# Patient Record
Sex: MALE | Race: WHITE | NOT HISPANIC OR LATINO | Employment: OTHER | URBAN - METROPOLITAN AREA
[De-identification: names, ages, dates, MRNs, and addresses within clinical notes are randomized per-mention and may not be internally consistent; named-entity substitution may affect disease eponyms.]

---

## 2021-03-06 ENCOUNTER — IMMUNIZATION (OUTPATIENT)
Dept: PRIMARY CARE CLINIC | Facility: CLINIC | Age: 81
End: 2021-03-06
Payer: MEDICARE

## 2021-03-06 DIAGNOSIS — Z23 NEED FOR VACCINATION: Primary | ICD-10-CM

## 2021-03-06 PROCEDURE — 91300 PR SARS-COV- 2 COVID-19 VACCINE, NO PRSV, 30MCG/0.3ML, IM: CPT | Mod: S$GLB,,, | Performed by: INTERNAL MEDICINE

## 2021-03-06 PROCEDURE — 91300 PR SARS-COV- 2 COVID-19 VACCINE, NO PRSV, 30MCG/0.3ML, IM: ICD-10-PCS | Mod: S$GLB,,, | Performed by: INTERNAL MEDICINE

## 2021-03-06 PROCEDURE — 0001A PR IMMUNIZ ADMIN, SARS-COV-2 COVID-19 VACC, 30MCG/0.3ML, 1ST DOSE: ICD-10-PCS | Mod: CV19,S$GLB,, | Performed by: INTERNAL MEDICINE

## 2021-03-06 PROCEDURE — 0001A PR IMMUNIZ ADMIN, SARS-COV-2 COVID-19 VACC, 30MCG/0.3ML, 1ST DOSE: CPT | Mod: CV19,S$GLB,, | Performed by: INTERNAL MEDICINE

## 2021-03-06 RX ADMIN — Medication 0.3 ML: at 11:03

## 2023-01-04 ENCOUNTER — OFFICE VISIT (OUTPATIENT)
Dept: OPHTHALMOLOGY | Facility: CLINIC | Age: 83
End: 2023-01-04
Payer: MEDICARE

## 2023-01-04 DIAGNOSIS — H25.13 NUCLEAR SCLEROTIC CATARACT, BILATERAL: Primary | ICD-10-CM

## 2023-01-04 DIAGNOSIS — H35.373 EPIRETINAL MEMBRANE (ERM) OF BOTH EYES: ICD-10-CM

## 2023-01-04 DIAGNOSIS — H25.13 NUCLEAR SCLEROSIS, BILATERAL: ICD-10-CM

## 2023-01-04 PROCEDURE — 99204 OFFICE O/P NEW MOD 45 MIN: CPT | Mod: S$GLB,,, | Performed by: OPHTHALMOLOGY

## 2023-01-04 PROCEDURE — 92136 OPHTHALMIC BIOMETRY: CPT | Mod: RT,S$GLB,, | Performed by: OPHTHALMOLOGY

## 2023-01-04 PROCEDURE — 99999 PR PBB SHADOW E&M-EST. PATIENT-LVL II: ICD-10-PCS | Mod: PBBFAC,,, | Performed by: OPHTHALMOLOGY

## 2023-01-04 PROCEDURE — 99204 PR OFFICE/OUTPT VISIT, NEW, LEVL IV, 45-59 MIN: ICD-10-PCS | Mod: S$GLB,,, | Performed by: OPHTHALMOLOGY

## 2023-01-04 PROCEDURE — 1101F PT FALLS ASSESS-DOCD LE1/YR: CPT | Mod: CPTII,S$GLB,, | Performed by: OPHTHALMOLOGY

## 2023-01-04 PROCEDURE — 1126F AMNT PAIN NOTED NONE PRSNT: CPT | Mod: CPTII,S$GLB,, | Performed by: OPHTHALMOLOGY

## 2023-01-04 PROCEDURE — 1126F PR PAIN SEVERITY QUANTIFIED, NO PAIN PRESENT: ICD-10-PCS | Mod: CPTII,S$GLB,, | Performed by: OPHTHALMOLOGY

## 2023-01-04 PROCEDURE — 1101F PR PT FALLS ASSESS DOC 0-1 FALLS W/OUT INJ PAST YR: ICD-10-PCS | Mod: CPTII,S$GLB,, | Performed by: OPHTHALMOLOGY

## 2023-01-04 PROCEDURE — 1160F PR REVIEW ALL MEDS BY PRESCRIBER/CLIN PHARMACIST DOCUMENTED: ICD-10-PCS | Mod: CPTII,S$GLB,, | Performed by: OPHTHALMOLOGY

## 2023-01-04 PROCEDURE — 3288F PR FALLS RISK ASSESSMENT DOCUMENTED: ICD-10-PCS | Mod: CPTII,S$GLB,, | Performed by: OPHTHALMOLOGY

## 2023-01-04 PROCEDURE — 99999 PR PBB SHADOW E&M-EST. PATIENT-LVL II: CPT | Mod: PBBFAC,,, | Performed by: OPHTHALMOLOGY

## 2023-01-04 PROCEDURE — 92136 IOL MASTER - OU - BOTH EYES: ICD-10-PCS | Mod: RT,S$GLB,, | Performed by: OPHTHALMOLOGY

## 2023-01-04 PROCEDURE — 3288F FALL RISK ASSESSMENT DOCD: CPT | Mod: CPTII,S$GLB,, | Performed by: OPHTHALMOLOGY

## 2023-01-04 PROCEDURE — 1159F PR MEDICATION LIST DOCUMENTED IN MEDICAL RECORD: ICD-10-PCS | Mod: CPTII,S$GLB,, | Performed by: OPHTHALMOLOGY

## 2023-01-04 PROCEDURE — 1160F RVW MEDS BY RX/DR IN RCRD: CPT | Mod: CPTII,S$GLB,, | Performed by: OPHTHALMOLOGY

## 2023-01-04 PROCEDURE — 1159F MED LIST DOCD IN RCRD: CPT | Mod: CPTII,S$GLB,, | Performed by: OPHTHALMOLOGY

## 2023-01-04 RX ORDER — MOXIFLOXACIN 5 MG/ML
1 SOLUTION/ DROPS OPHTHALMIC
Status: CANCELLED | OUTPATIENT
Start: 2023-01-04

## 2023-01-04 RX ORDER — PHENYLEPHRINE HYDROCHLORIDE 100 MG/ML
1 SOLUTION/ DROPS OPHTHALMIC
Status: CANCELLED | OUTPATIENT
Start: 2023-01-04

## 2023-01-04 RX ORDER — TROPICAMIDE 10 MG/ML
1 SOLUTION/ DROPS OPHTHALMIC
Status: CANCELLED | OUTPATIENT
Start: 2023-01-04

## 2023-01-04 RX ORDER — TETRACAINE HYDROCHLORIDE 5 MG/ML
1 SOLUTION OPHTHALMIC
Status: CANCELLED | OUTPATIENT
Start: 2023-01-04

## 2023-01-04 RX ORDER — PHENYLEPHRINE HYDROCHLORIDE 25 MG/ML
1 SOLUTION/ DROPS OPHTHALMIC
Status: CANCELLED | OUTPATIENT
Start: 2023-01-04

## 2023-01-04 RX ORDER — PREDNISOLONE ACETATE-GATIFLOXACIN-BROMFENAC .75; 5; 1 MG/ML; MG/ML; MG/ML
1 SUSPENSION/ DROPS OPHTHALMIC 3 TIMES DAILY
Qty: 5 ML | Refills: 3 | Status: SHIPPED | OUTPATIENT
Start: 2023-01-04

## 2023-01-04 NOTE — PROGRESS NOTES
HPI    Patient presents today for a Cataract Evaluation. Patient notes vision as   blurry. 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 Patient notes no eye pain.     Last edited by Onur Hernandez on 1/4/2023  9:09 AM.            Assessment /Plan     For exam results, see Encounter Report.    Nuclear sclerotic cataract, bilateral    Nuclear sclerosis, bilateral    Epiretinal membrane (ERM) of both eyes      Visually Significant Cataract: Patient reports decreased vision consistent with the clinical amount of lenticular opacity, which reaches the level of visual significance and affects activities of daily living.     Specifically, this patient describes difficulty with:  - driving safely at night  - reading road signs  - reading small print  - deciphering medicine bottles  - reading the newspaper  - using the phone  - reading texts     Risks, benefits, and alternatives to cataract surgery were discussed and the consent reviewed. IOL options were discussed, including ATIOLs and the associated side effects and additional patient cost associated with them.   IOL Selections:   Right eye  IOL: CNA0T0 22.0     Left eye  IOL: CNA0T0 22.0    Pt wishes to have RIGHT eye done first.  Hx ERM with pucker OD>>OS followed by Dr Sarkar

## 2023-02-27 ENCOUNTER — TELEPHONE (OUTPATIENT)
Dept: OPHTHALMOLOGY | Facility: CLINIC | Age: 83
End: 2023-02-27
Payer: MEDICARE

## 2023-02-27 DIAGNOSIS — H25.11 NUCLEAR SCLEROTIC CATARACT OF RIGHT EYE: Primary | ICD-10-CM

## 2023-03-06 ENCOUNTER — TELEPHONE (OUTPATIENT)
Dept: OPHTHALMOLOGY | Facility: CLINIC | Age: 83
End: 2023-03-06
Payer: MEDICARE

## 2023-03-06 NOTE — TELEPHONE ENCOUNTER
----- Message from Love Russo sent at 3/3/2023  1:41 PM CST -----  Regarding: Pt call back  Pt returning missed call . Pts call back-[phone] (home)

## 2023-03-06 NOTE — TELEPHONE ENCOUNTER
Patient given arrival time of 10:30 am on Thursday March 9. Nothing to eat or drink after 9 pm.  Water, Gatorade after 9 pm until leaves home.  Start drops into the operative eye today. [address]

## 2023-03-27 ENCOUNTER — TELEPHONE (OUTPATIENT)
Dept: OPHTHALMOLOGY | Facility: CLINIC | Age: 83
End: 2023-03-27
Payer: MEDICARE

## 2023-06-30 ENCOUNTER — OFFICE VISIT (OUTPATIENT)
Dept: OPHTHALMOLOGY | Facility: CLINIC | Age: 83
End: 2023-06-30
Payer: MEDICARE

## 2023-06-30 DIAGNOSIS — H25.13 NUCLEAR SCLEROTIC CATARACT, BILATERAL: Primary | ICD-10-CM

## 2023-06-30 DIAGNOSIS — H35.373 EPIRETINAL MEMBRANE (ERM) OF BOTH EYES: ICD-10-CM

## 2023-06-30 PROCEDURE — 1101F PT FALLS ASSESS-DOCD LE1/YR: CPT | Mod: CPTII,S$GLB,, | Performed by: OPHTHALMOLOGY

## 2023-06-30 PROCEDURE — 3288F PR FALLS RISK ASSESSMENT DOCUMENTED: ICD-10-PCS | Mod: CPTII,S$GLB,, | Performed by: OPHTHALMOLOGY

## 2023-06-30 PROCEDURE — 99214 PR OFFICE/OUTPT VISIT, EST, LEVL IV, 30-39 MIN: ICD-10-PCS | Mod: S$GLB,,, | Performed by: OPHTHALMOLOGY

## 2023-06-30 PROCEDURE — 1101F PR PT FALLS ASSESS DOC 0-1 FALLS W/OUT INJ PAST YR: ICD-10-PCS | Mod: CPTII,S$GLB,, | Performed by: OPHTHALMOLOGY

## 2023-06-30 PROCEDURE — 3288F FALL RISK ASSESSMENT DOCD: CPT | Mod: CPTII,S$GLB,, | Performed by: OPHTHALMOLOGY

## 2023-06-30 PROCEDURE — 1160F RVW MEDS BY RX/DR IN RCRD: CPT | Mod: CPTII,S$GLB,, | Performed by: OPHTHALMOLOGY

## 2023-06-30 PROCEDURE — 1126F AMNT PAIN NOTED NONE PRSNT: CPT | Mod: CPTII,S$GLB,, | Performed by: OPHTHALMOLOGY

## 2023-06-30 PROCEDURE — 99999 PR PBB SHADOW E&M-EST. PATIENT-LVL III: ICD-10-PCS | Mod: PBBFAC,,, | Performed by: OPHTHALMOLOGY

## 2023-06-30 PROCEDURE — 99214 OFFICE O/P EST MOD 30 MIN: CPT | Mod: S$GLB,,, | Performed by: OPHTHALMOLOGY

## 2023-06-30 PROCEDURE — 1159F PR MEDICATION LIST DOCUMENTED IN MEDICAL RECORD: ICD-10-PCS | Mod: CPTII,S$GLB,, | Performed by: OPHTHALMOLOGY

## 2023-06-30 PROCEDURE — 1126F PR PAIN SEVERITY QUANTIFIED, NO PAIN PRESENT: ICD-10-PCS | Mod: CPTII,S$GLB,, | Performed by: OPHTHALMOLOGY

## 2023-06-30 PROCEDURE — 1160F PR REVIEW ALL MEDS BY PRESCRIBER/CLIN PHARMACIST DOCUMENTED: ICD-10-PCS | Mod: CPTII,S$GLB,, | Performed by: OPHTHALMOLOGY

## 2023-06-30 PROCEDURE — 99999 PR PBB SHADOW E&M-EST. PATIENT-LVL III: CPT | Mod: PBBFAC,,, | Performed by: OPHTHALMOLOGY

## 2023-06-30 PROCEDURE — 1159F MED LIST DOCD IN RCRD: CPT | Mod: CPTII,S$GLB,, | Performed by: OPHTHALMOLOGY

## 2023-06-30 RX ORDER — PREDNISOLONE ACETATE-GATIFLOXACIN-BROMFENAC .75; 5; 1 MG/ML; MG/ML; MG/ML
1 SUSPENSION/ DROPS OPHTHALMIC 3 TIMES DAILY
Qty: 5 ML | Refills: 3 | Status: SHIPPED | OUTPATIENT
Start: 2023-06-30

## 2023-06-30 NOTE — PROGRESS NOTES
HPI    Pt presents for follow up to cataract evaluation 1/4/23  Patient notes no eye pain  Patient notes vision as blurry OD >> OS  Last edited by Onur Hernandez on 6/30/2023  1:18 PM.            Assessment /Plan     For exam results, see Encounter Report.    Nuclear sclerotic cataract, bilateral    Epiretinal membrane (ERM) of both eyes        Visually Significant Cataract: Patient reports decreased vision consistent with the clinical amount of lenticular opacity, which reaches the level of visual significance and affects activities of daily living.     Specifically, this patient describes difficulty with:  - driving safely at night  - reading road signs  - reading small print  - deciphering medicine bottles  - reading the newspaper  - using the phone  - reading texts     Risks, benefits, and alternatives to cataract surgery were discussed and the consent reviewed. IOL options were discussed, including ATIOLs and the associated side effects and additional patient cost associated with them.   IOL Selections:   Right eye  IOL: CNA0T0 22.0     Left eye  IOL: CNA0T0 22.0    Pt wishes to have RIGHT eye done first.  Hx ERM with pucker OD>>OS followed by Dr Sarkar

## 2023-07-10 ENCOUNTER — TELEPHONE (OUTPATIENT)
Dept: OPHTHALMOLOGY | Facility: CLINIC | Age: 83
End: 2023-07-10
Payer: MEDICARE

## 2023-07-12 ENCOUNTER — TELEPHONE (OUTPATIENT)
Dept: OPHTHALMOLOGY | Facility: CLINIC | Age: 83
End: 2023-07-12
Payer: MEDICARE

## 2023-07-12 DIAGNOSIS — H25.11 NUCLEAR SCLEROTIC CATARACT OF RIGHT EYE: Primary | ICD-10-CM

## 2023-07-27 ENCOUNTER — TELEPHONE (OUTPATIENT)
Dept: OPHTHALMOLOGY | Facility: CLINIC | Age: 83
End: 2023-07-27
Payer: MEDICARE

## 2023-07-27 NOTE — TELEPHONE ENCOUNTER
----- Message from El Mcconnell sent at 7/27/2023 11:27 AM CDT -----  Contact: [phone]  Pt is calling to get a referral for Dr. Jones  for his macular pucker. Please call back to further assist once referral is put in.

## 2023-07-27 NOTE — TELEPHONE ENCOUNTER
----- Message from Jeannine Tellez sent at 7/27/2023  2:06 PM CDT -----  Contact: june @ [phone]  JENNIFER JENKINS calling regarding Appointment Access  (message) for #pt is calling about a package the lhe supposed to get for surgery. Asking for call back

## 2023-08-07 ENCOUNTER — TELEPHONE (OUTPATIENT)
Dept: OPHTHALMOLOGY | Facility: CLINIC | Age: 83
End: 2023-08-07
Payer: MEDICARE

## 2023-08-16 ENCOUNTER — TELEPHONE (OUTPATIENT)
Dept: OPHTHALMOLOGY | Facility: CLINIC | Age: 83
End: 2023-08-16
Payer: MEDICARE

## 2023-08-16 ENCOUNTER — ANESTHESIA EVENT (OUTPATIENT)
Dept: SURGERY | Facility: HOSPITAL | Age: 83
End: 2023-08-16
Payer: MEDICARE

## 2023-08-16 NOTE — ANESTHESIA PREPROCEDURE EVALUATION
08/16/2023  Joni Avelar is a 83 y.o., male.      Pre-op Assessment    I have reviewed the Patient Summary Reports.    I have reviewed the NPO Status.   I have reviewed the Medications.     Review of Systems  Anesthesia Hx:  Denies Family Hx of Anesthesia complications.   Denies Personal Hx of Anesthesia complications.   Hematology/Oncology:  Hematology Normal   Oncology Normal     EENT/Dental:  EENT/Dental Normal Eyes:    Cardiovascular:   Hypertension Past MI CAD  CABG/stent Dysrhythmias atrial fibrillation hyperlipidemia    Pulmonary:  Pulmonary Normal    Renal/:   Chronic Renal Disease renal calculi    Hepatic/GI:  Hepatic/GI Normal    81-year-old male with hypertension, abnormal lipids, non-ST segment elevation MI in August 2015 followed by coronary bypass surgery, history of paroxysmal atrial fibrillation, history of DVT   and nephrolithiasis   The patient was admitted with bradycardia which probably represented uncounted PACs and PVCs. His atenolol dose was decreased       Physical Exam  General: Well nourished, Cooperative, Alert and Oriented    Airway:  Mouth Opening: Normal  TM Distance: Normal  Tongue: Normal        Anesthesia Plan  Type of Anesthesia, risks & benefits discussed:    Anesthesia Type: MAC  Intra-op Monitoring Plan: Standard ASA Monitors  Post Op Pain Control Plan: multimodal analgesia and IV/PO Opioids PRN  Induction:  IV  Informed Consent: Informed consent signed with the Patient and all parties understand the risks and agree with anesthesia plan.  All questions answered.   ASA Score: 3  Day of Surgery Review of History & Physical: H&P Update referred to the surgeon/provider.I have interviewed and examined the patient. I have reviewed the patient's H&P dated: There are no significant changes. H&P completed by Anesthesiologist.    Ready For Surgery From Anesthesia Perspective.      .

## 2023-08-16 NOTE — TELEPHONE ENCOUNTER
----- Message from Delores Greco sent at 8/16/2023 11:42 AM CDT -----  Consult/Advisory    Name Of Caller:Joni       Contact Preference:[phone]    Nature of call: Ptn requesting a call to discuss his sx

## 2023-08-16 NOTE — TELEPHONE ENCOUNTER
----- Message from El Mcconnell sent at 8/16/2023 11:56 AM CDT -----  Contact: [phone]  Pt is returning your call about his SX. Please call back to further assist.

## 2023-08-17 ENCOUNTER — TELEPHONE (OUTPATIENT)
Dept: OPHTHALMOLOGY | Facility: CLINIC | Age: 83
End: 2023-08-17
Payer: MEDICARE

## 2023-08-17 RX ORDER — AMLODIPINE BESYLATE 5 MG/1
TABLET ORAL
COMMUNITY
Start: 2023-07-07

## 2023-08-17 RX ORDER — APIXABAN 5 MG/1
TABLET, FILM COATED ORAL
COMMUNITY
Start: 2023-07-19

## 2023-08-17 RX ORDER — IRBESARTAN 300 MG/1
150 TABLET ORAL 2 TIMES DAILY
COMMUNITY
Start: 2023-07-24

## 2023-08-17 RX ORDER — ASPIRIN 81 MG/1
81 TABLET ORAL
COMMUNITY

## 2023-08-17 NOTE — TELEPHONE ENCOUNTER
Patient given arrival time of 8:30 am on Monday August 21  . Nothing to eat or drink after 11:59 pm.  Water, Gatorade after 11:59 pm until leaves home.  Start drops into the operative eye today. [address] [facility]

## 2023-08-17 NOTE — PRE-PROCEDURE INSTRUCTIONS
- Nothing to eat or drink after midinight, except AM meds with small sips of water, Gatorade/Powerade  - Hold all Diabetic meds AM of surgery  - Hold all Insulin AM of surgery  - Hold all Fluid pills AM of surgery  - Hold all non-insulin shots until after surgery (Ozempic, Mounjaro, Trulicity, Victoza, Byetta, Wegovy and Adlyxin) (up to 7 days prior)  - Take all B/P meds, except those that contain a fluid pill  - Hold all vits and herbal meds AM of surgery  - Use inhalers as needed and bring AM of surgery  - Take blood thinner meds AM of surgery  - Use eye drops as directed  - Shower and wash face with dial soap for 3 mins PM prior and AM of surgery  - No powder, lotions, creams, (makeup),  or jewelry    - Wear comfortable clothing (button up shirt)    (Patients ride may not leave while patient is in surgery)    -- 2nd floor surgery ctr at [facility] @ [address] [facility] [place] [address]       Pt voiced understanding

## 2023-08-18 RX ORDER — IBUPROFEN 200 MG
200 TABLET ORAL EVERY 6 HOURS PRN
COMMUNITY

## 2023-08-18 RX ORDER — ACETAMINOPHEN 500 MG
500 TABLET ORAL EVERY 6 HOURS PRN
COMMUNITY

## 2023-08-21 ENCOUNTER — ANESTHESIA (OUTPATIENT)
Dept: SURGERY | Facility: HOSPITAL | Age: 83
End: 2023-08-21
Payer: MEDICARE

## 2023-08-21 ENCOUNTER — HOSPITAL ENCOUNTER (OUTPATIENT)
Facility: HOSPITAL | Age: 83
Discharge: HOME | End: 2023-08-21
Attending: OPHTHALMOLOGY | Admitting: OPHTHALMOLOGY
Payer: MEDICARE

## 2023-08-21 VITALS
HEIGHT: 73 IN | WEIGHT: 175 LBS | DIASTOLIC BLOOD PRESSURE: 67 MMHG | BODY MASS INDEX: 23.19 KG/M2 | TEMPERATURE: 98 F | SYSTOLIC BLOOD PRESSURE: 125 MMHG | HEART RATE: 68 BPM | RESPIRATION RATE: 16 BRPM | OXYGEN SATURATION: 96 %

## 2023-08-21 DIAGNOSIS — H25.11 NUCLEAR SCLEROTIC CATARACT OF RIGHT EYE: Primary | ICD-10-CM

## 2023-08-21 PROCEDURE — 25000003 PHARM REV CODE 250: Performed by: OPHTHALMOLOGY

## 2023-08-21 PROCEDURE — 36000706: Performed by: OPHTHALMOLOGY

## 2023-08-21 PROCEDURE — D9220A PRA ANESTHESIA: ICD-10-PCS | Mod: ,,, | Performed by: NURSE ANESTHETIST, CERTIFIED REGISTERED

## 2023-08-21 PROCEDURE — V2632 POST CHMBR INTRAOCULAR LENS: HCPCS | Performed by: OPHTHALMOLOGY

## 2023-08-21 PROCEDURE — 37000008 HC ANESTHESIA 1ST 15 MINUTES: Performed by: OPHTHALMOLOGY

## 2023-08-21 PROCEDURE — 66984 PR REMOVAL, CATARACT, W/INSRT INTRAOC LENS, W/O ENDO CYCLO: ICD-10-PCS | Mod: RT,,, | Performed by: OPHTHALMOLOGY

## 2023-08-21 PROCEDURE — 99900035 HC TECH TIME PER 15 MIN (STAT)

## 2023-08-21 PROCEDURE — D9220A PRA ANESTHESIA: Mod: ,,, | Performed by: NURSE ANESTHETIST, CERTIFIED REGISTERED

## 2023-08-21 PROCEDURE — 36000707: Performed by: OPHTHALMOLOGY

## 2023-08-21 PROCEDURE — 37000009 HC ANESTHESIA EA ADD 15 MINS: Performed by: OPHTHALMOLOGY

## 2023-08-21 PROCEDURE — 63600175 PHARM REV CODE 636 W HCPCS: Performed by: NURSE ANESTHETIST, CERTIFIED REGISTERED

## 2023-08-21 PROCEDURE — 71000015 HC POSTOP RECOV 1ST HR: Performed by: OPHTHALMOLOGY

## 2023-08-21 PROCEDURE — 94761 N-INVAS EAR/PLS OXIMETRY MLT: CPT

## 2023-08-21 PROCEDURE — 66984 XCAPSL CTRC RMVL W/O ECP: CPT | Mod: RT,,, | Performed by: OPHTHALMOLOGY

## 2023-08-21 DEVICE — LENS CLAREON AUTONOME 22.0D: Type: IMPLANTABLE DEVICE | Site: EYE | Status: FUNCTIONAL

## 2023-08-21 RX ORDER — PROPARACAINE HYDROCHLORIDE 5 MG/ML
1 SOLUTION/ DROPS OPHTHALMIC
Status: DISCONTINUED | OUTPATIENT
Start: 2023-08-21 | End: 2023-08-21 | Stop reason: HOSPADM

## 2023-08-21 RX ORDER — ACETAMINOPHEN 325 MG/1
650 TABLET ORAL EVERY 4 HOURS PRN
Status: DISCONTINUED | OUTPATIENT
Start: 2023-08-21 | End: 2023-08-21 | Stop reason: HOSPADM

## 2023-08-21 RX ORDER — MOXIFLOXACIN 5 MG/ML
1 SOLUTION/ DROPS OPHTHALMIC
Status: DISCONTINUED | OUTPATIENT
Start: 2023-08-21 | End: 2023-08-21 | Stop reason: HOSPADM

## 2023-08-21 RX ORDER — MIDAZOLAM HYDROCHLORIDE 1 MG/ML
INJECTION, SOLUTION INTRAMUSCULAR; INTRAVENOUS
Status: DISCONTINUED | OUTPATIENT
Start: 2023-08-21 | End: 2023-08-21

## 2023-08-21 RX ORDER — TETRACAINE HYDROCHLORIDE 5 MG/ML
SOLUTION OPHTHALMIC
Status: DISCONTINUED | OUTPATIENT
Start: 2023-08-21 | End: 2023-08-21 | Stop reason: HOSPADM

## 2023-08-21 RX ORDER — PHENYLEPHRINE HYDROCHLORIDE 100 MG/ML
1 SOLUTION/ DROPS OPHTHALMIC
Status: DISCONTINUED | OUTPATIENT
Start: 2023-08-21 | End: 2023-08-21 | Stop reason: HOSPADM

## 2023-08-21 RX ORDER — MOXIFLOXACIN 5 MG/ML
SOLUTION/ DROPS OPHTHALMIC
Status: DISCONTINUED | OUTPATIENT
Start: 2023-08-21 | End: 2023-08-21 | Stop reason: HOSPADM

## 2023-08-21 RX ORDER — LIDOCAINE HYDROCHLORIDE 40 MG/ML
INJECTION, SOLUTION RETROBULBAR
Status: DISCONTINUED | OUTPATIENT
Start: 2023-08-21 | End: 2023-08-21 | Stop reason: HOSPADM

## 2023-08-21 RX ORDER — MOXIFLOXACIN 5 MG/ML
1 SOLUTION/ DROPS OPHTHALMIC
Status: COMPLETED | OUTPATIENT
Start: 2023-08-21 | End: 2023-08-21

## 2023-08-21 RX ORDER — CYCLOP/TROP/PROPA/PHEN/KET/WAT 1-1-0.1%
1 DROPS (EA) OPHTHALMIC (EYE)
Status: COMPLETED | OUTPATIENT
Start: 2023-08-21 | End: 2023-08-21

## 2023-08-21 RX ADMIN — MIDAZOLAM HYDROCHLORIDE 1 MG: 1 INJECTION, SOLUTION INTRAMUSCULAR; INTRAVENOUS at 09:08

## 2023-08-21 RX ADMIN — Medication 1 DROP: at 09:08

## 2023-08-21 RX ADMIN — MOXIFLOXACIN OPHTHALMIC 1 DROP: 5 SOLUTION/ DROPS OPHTHALMIC at 09:08

## 2023-08-21 RX ADMIN — MOXIFLOXACIN 1 DROP: 5 SOLUTION/ DROPS OPHTHALMIC at 10:08

## 2023-08-21 NOTE — OP NOTE
SURGEON:  Jewel Ho M.D.    PREOPERATIVE DIAGNOSIS:    Nuclear Sclerotic Cataract Right Eye    POSTOPERATIVE DIAGNOSIS:    Nuclear Sclerotic Cataract Right Eye    PROCEDURES:    Phacoemulsification with  intraocular lens, Right eye (98389)    DATE OF SURGERY: 08/21/2023    IMPLANT: CNA0T0 22.0    ANESTHESIA:  MAC with topical Lidocaine    COMPLICATIONS:  None    ESTIMATED BLOOD LOSS: None    SPECIMENS: None    INDICATIONS:    The patient has a history of painless progressive visual loss and difficulty with activities of daily living, which specifically include difficult driving at night due to glare and difficulty reading small print, secondary to cataract formation.  After a thorough discussion of the risks, benefits, and alternatives to cataract surgery, including, but not limited to, the rare risks of infection, retinal detachment, hemorrhage, need for additional surgery, loss of vision, and even loss of the eye, the patient voices understanding and desires to proceed.    DESCRIPTION OF PROCEDURE:    The patients IOL calculations were reviewed, and the lens selection confirmed.   After verification and marking of the proper eye in the preop holding area, the patient was brought to the operating room in supine position where the eye was prepped and draped in standard sterile fashion with 5% Betadine and a lid speculum placed in the eye.   Topical 4% Lidocaine was used in addition to the preoperative anesthesia and the procedure was begun by the creation of a paracentesis incision through which viscoelastic was used to fill the anterior chamber.  Next, a keratome blade was used to create a triplanar temporal clear corneal incision and a cystotome and Utrata forceps used to fashion a continuous curvilinear capsulorrhexis.  Hydrodissection was carried out using the Call hydrodissection cannula and the nucleus was found to be mobile.  Phacoemulsification of the nucleus was carried out using a quick chop technique,  and all remaining epinuclear and cortical material was removed.  The eye was then reformed with Viscoelastic and the  intraocular lens was implanted into the capsular bag.  All remaining viscoelastics were removed from the eye and at the end of the case the pupil was round, the lens was well-centered within the capsular bag and all wounds were found to be water tight.  Drops of Vigamox and Pred Forte were instilled and a shield was placed over the eye. The patient will follow up with Dr. Ho in the morning.

## 2023-08-21 NOTE — DISCHARGE INSTRUCTIONS
CATARACT SURGERY    POST-OPERATIVE INSTRUCTIONS    · Apply drops THREE times a day into operative eye for 30 days.    · DO NOT rub your eye    · Wear protective sunglasses during the day    · Resume moderate activity    · Bathe/shower/wash face normally    · DO NOT apply makeup around the operative eye for 1 week.         You should expect    - Blurry vision and halos for 24-48 hours    - Dilated pupil for 24-48 hours    - Scratchy feeling in the eye for 1-2 days    - Curved shadow in your peripheral vision for 2-3 weeks    - Occasional flickering of lights for up to 1 week    -If you experience severe pain or nausea, please call Dr Ho or the on-call doctor at [phone]    - Plan to see Dr Ho tomorrow .      [facility]    [address] [facility] [address]    ** Most patients can drive the next day, but if you do not feel comfortable driving, please arrange for transportation.

## 2023-08-21 NOTE — TRANSFER OF CARE
"Anesthesia Transfer of Care Note    Patient: Joni Avelar    Procedure(s) Performed: Procedure(s) (LRB):  EXTRACTION, CATARACT, WITH IOL INSERTION (Right)    Patient location: PACU    Anesthesia Type: MAC    Transport from OR: Transported from OR on room air with adequate spontaneous ventilation    Post pain: adequate analgesia    Post assessment: no apparent anesthetic complications    Post vital signs: stable    Level of consciousness: awake    Nausea/Vomiting: no nausea/vomiting    Complications: none    Transfer of care protocol was followed      Last vitals:   Visit Vitals  /83 (BP Location: Left arm, Patient Position: Sitting)   Pulse 60   Temp 36.8 °C (98.2 °F) (Temporal)   Resp 16   Ht 6' 1" (1.854 m)   Wt 79.4 kg (175 lb)   SpO2 97%   BMI 23.09 kg/m²     "

## 2023-08-21 NOTE — DISCHARGE SUMMARY
Outcome: Successful outpatient ophthalmic surgical procedure  Preprinted Instructions given to patient.  Regular diet.  Activity: No restrictions  Meds: see Med Rec  Condition: stable  Follow up: 1 day with Dr Ho  Disposition: Home  Diagnosis: s/p eye surgery  Date of discharge: 08/21/2023

## 2023-08-21 NOTE — ANESTHESIA POSTPROCEDURE EVALUATION
Anesthesia Post Evaluation    Patient: Joni Avelar    Procedure(s) Performed: Procedure(s) (LRB):  EXTRACTION, CATARACT, WITH IOL INSERTION (Right)    Final Anesthesia Type: MAC      Patient location during evaluation: PACU  Patient participation: Yes- Able to Participate  Level of consciousness: awake and alert  Post-procedure vital signs: reviewed and stable  Pain management: adequate  Airway patency: patent    PONV status at discharge: No PONV  Anesthetic complications: no      Cardiovascular status: blood pressure returned to baseline  Respiratory status: unassisted, spontaneous ventilation and room air  Hydration status: euvolemic  Follow-up not needed.          Vitals Value Taken Time   /76 08/21/23 1016   Temp 36.8 °C (98.2 °F) 08/21/23 1010   Pulse 70 08/21/23 1016   Resp 16 08/21/23 1016   SpO2 96 % 08/21/23 1016         No case tracking events are documented in the log.      Pain/Conrado Score: Conrado Score: 10 (8/21/2023 10:10 AM)

## 2023-08-22 ENCOUNTER — OFFICE VISIT (OUTPATIENT)
Dept: OPHTHALMOLOGY | Facility: CLINIC | Age: 83
End: 2023-08-22
Payer: MEDICARE

## 2023-08-22 DIAGNOSIS — Z98.890 POST-OPERATIVE STATE: Primary | ICD-10-CM

## 2023-08-22 DIAGNOSIS — H25.11 NUCLEAR SCLEROSIS OF RIGHT EYE: ICD-10-CM

## 2023-08-22 PROCEDURE — 99999 PR PBB SHADOW E&M-EST. PATIENT-LVL III: CPT | Mod: PBBFAC,,, | Performed by: OPHTHALMOLOGY

## 2023-08-22 PROCEDURE — 1101F PT FALLS ASSESS-DOCD LE1/YR: CPT | Mod: CPTII,S$GLB,, | Performed by: OPHTHALMOLOGY

## 2023-08-22 PROCEDURE — 99024 PR POST-OP FOLLOW-UP VISIT: ICD-10-PCS | Mod: S$GLB,,, | Performed by: OPHTHALMOLOGY

## 2023-08-22 PROCEDURE — 1101F PR PT FALLS ASSESS DOC 0-1 FALLS W/OUT INJ PAST YR: ICD-10-PCS | Mod: CPTII,S$GLB,, | Performed by: OPHTHALMOLOGY

## 2023-08-22 PROCEDURE — 1160F PR REVIEW ALL MEDS BY PRESCRIBER/CLIN PHARMACIST DOCUMENTED: ICD-10-PCS | Mod: CPTII,S$GLB,, | Performed by: OPHTHALMOLOGY

## 2023-08-22 PROCEDURE — 99999 PR PBB SHADOW E&M-EST. PATIENT-LVL III: ICD-10-PCS | Mod: PBBFAC,,, | Performed by: OPHTHALMOLOGY

## 2023-08-22 PROCEDURE — 3288F FALL RISK ASSESSMENT DOCD: CPT | Mod: CPTII,S$GLB,, | Performed by: OPHTHALMOLOGY

## 2023-08-22 PROCEDURE — 1159F MED LIST DOCD IN RCRD: CPT | Mod: CPTII,S$GLB,, | Performed by: OPHTHALMOLOGY

## 2023-08-22 PROCEDURE — 1160F RVW MEDS BY RX/DR IN RCRD: CPT | Mod: CPTII,S$GLB,, | Performed by: OPHTHALMOLOGY

## 2023-08-22 PROCEDURE — 3288F PR FALLS RISK ASSESSMENT DOCUMENTED: ICD-10-PCS | Mod: CPTII,S$GLB,, | Performed by: OPHTHALMOLOGY

## 2023-08-22 PROCEDURE — 1126F PR PAIN SEVERITY QUANTIFIED, NO PAIN PRESENT: ICD-10-PCS | Mod: CPTII,S$GLB,, | Performed by: OPHTHALMOLOGY

## 2023-08-22 PROCEDURE — 1159F PR MEDICATION LIST DOCUMENTED IN MEDICAL RECORD: ICD-10-PCS | Mod: CPTII,S$GLB,, | Performed by: OPHTHALMOLOGY

## 2023-08-22 PROCEDURE — 1126F AMNT PAIN NOTED NONE PRSNT: CPT | Mod: CPTII,S$GLB,, | Performed by: OPHTHALMOLOGY

## 2023-08-22 PROCEDURE — 99024 POSTOP FOLLOW-UP VISIT: CPT | Mod: S$GLB,,, | Performed by: OPHTHALMOLOGY

## 2023-08-22 NOTE — PROGRESS NOTES
HPI    Patient presents for a one day P/O OD. Patient notes vision as stable.   Patient denies eye pain.     PGB TID OD    DATE OF SURGERY: 08/21/2023  IMPLANT: CNA0T0 22.0  Last edited by Onur Hernandez on 8/22/2023  8:25 AM.            Assessment /Plan     For exam results, see Encounter Report.    Post-operative state    Nuclear sclerosis of right eye      Slit lamp exam:  L/L: nl  K: clear, wound sealed  AC: 1+ cell  Lens: IOL centered and stable    POD1 s/p Phaco/IOL  Appropriate precautions and post op medications reviewed.  Patient instructed to call or come in if symptoms of redness, decreased vision, or pain are experienced.

## 2023-08-31 ENCOUNTER — OFFICE VISIT (OUTPATIENT)
Dept: OPHTHALMOLOGY | Facility: CLINIC | Age: 83
End: 2023-08-31
Payer: MEDICARE

## 2023-08-31 DIAGNOSIS — H25.13 NUCLEAR SCLEROSIS, BILATERAL: Primary | ICD-10-CM

## 2023-08-31 DIAGNOSIS — Z98.890 POST-OPERATIVE STATE: ICD-10-CM

## 2023-08-31 PROCEDURE — 1159F MED LIST DOCD IN RCRD: CPT | Mod: CPTII,S$GLB,, | Performed by: OPHTHALMOLOGY

## 2023-08-31 PROCEDURE — 1159F PR MEDICATION LIST DOCUMENTED IN MEDICAL RECORD: ICD-10-PCS | Mod: CPTII,S$GLB,, | Performed by: OPHTHALMOLOGY

## 2023-08-31 PROCEDURE — 99024 POSTOP FOLLOW-UP VISIT: CPT | Mod: S$GLB,,, | Performed by: OPHTHALMOLOGY

## 2023-08-31 PROCEDURE — 1160F RVW MEDS BY RX/DR IN RCRD: CPT | Mod: CPTII,S$GLB,, | Performed by: OPHTHALMOLOGY

## 2023-08-31 PROCEDURE — 1126F PR PAIN SEVERITY QUANTIFIED, NO PAIN PRESENT: ICD-10-PCS | Mod: CPTII,S$GLB,, | Performed by: OPHTHALMOLOGY

## 2023-08-31 PROCEDURE — 3288F PR FALLS RISK ASSESSMENT DOCUMENTED: ICD-10-PCS | Mod: CPTII,S$GLB,, | Performed by: OPHTHALMOLOGY

## 2023-08-31 PROCEDURE — 3288F FALL RISK ASSESSMENT DOCD: CPT | Mod: CPTII,S$GLB,, | Performed by: OPHTHALMOLOGY

## 2023-08-31 PROCEDURE — 1101F PR PT FALLS ASSESS DOC 0-1 FALLS W/OUT INJ PAST YR: ICD-10-PCS | Mod: CPTII,S$GLB,, | Performed by: OPHTHALMOLOGY

## 2023-08-31 PROCEDURE — 99999 PR PBB SHADOW E&M-EST. PATIENT-LVL III: ICD-10-PCS | Mod: PBBFAC,,, | Performed by: OPHTHALMOLOGY

## 2023-08-31 PROCEDURE — 1160F PR REVIEW ALL MEDS BY PRESCRIBER/CLIN PHARMACIST DOCUMENTED: ICD-10-PCS | Mod: CPTII,S$GLB,, | Performed by: OPHTHALMOLOGY

## 2023-08-31 PROCEDURE — 1101F PT FALLS ASSESS-DOCD LE1/YR: CPT | Mod: CPTII,S$GLB,, | Performed by: OPHTHALMOLOGY

## 2023-08-31 PROCEDURE — 99024 PR POST-OP FOLLOW-UP VISIT: ICD-10-PCS | Mod: S$GLB,,, | Performed by: OPHTHALMOLOGY

## 2023-08-31 PROCEDURE — 99999 PR PBB SHADOW E&M-EST. PATIENT-LVL III: CPT | Mod: PBBFAC,,, | Performed by: OPHTHALMOLOGY

## 2023-08-31 PROCEDURE — 1126F AMNT PAIN NOTED NONE PRSNT: CPT | Mod: CPTII,S$GLB,, | Performed by: OPHTHALMOLOGY

## 2023-09-01 ENCOUNTER — TELEPHONE (OUTPATIENT)
Dept: OPHTHALMOLOGY | Facility: CLINIC | Age: 83
End: 2023-09-01
Payer: MEDICARE

## 2023-09-01 RX ORDER — PHENYLEPHRINE HYDROCHLORIDE 100 MG/ML
1 SOLUTION/ DROPS OPHTHALMIC
Status: CANCELLED | OUTPATIENT
Start: 2023-09-01

## 2023-09-01 RX ORDER — MOXIFLOXACIN 5 MG/ML
1 SOLUTION/ DROPS OPHTHALMIC
Status: CANCELLED | OUTPATIENT
Start: 2023-09-01

## 2023-09-01 RX ORDER — CYCLOP/TROP/PROPA/PHEN/KET/WAT 1-1-0.1%
1 DROPS (EA) OPHTHALMIC (EYE)
Status: CANCELLED | OUTPATIENT
Start: 2023-09-01

## 2023-09-01 NOTE — TELEPHONE ENCOUNTER
Left message for patient to call back    Start Senna-Venkat 1 tablets at night for a few nights, then can increase to 1 tablet twice a ay for a few days, then 1 in morning and 2 at night for a few days and then 2 twice a day.     If not working, then take Miralax daily.

## 2023-09-01 NOTE — PROGRESS NOTES
HPI    Patient is here for 1 week po.  Vision is good and no pain.    PGB TID OD    DATE OF SURGERY: 08/21/2023  IMPLANT: CNA0T0 22.0  Last edited by Elena Canela on 8/31/2023  1:14 PM.            Assessment /Plan     For exam results, see Encounter Report.    Nuclear sclerosis, bilateral    Post-operative state      Slit lamp exam:  L/L: nl  K: clear, wound sealed  AC: trace cell  Iris/Lens: IOL centered and stable    POW1 s/p phaco: Surgery healing well with no signs of infection or abnormal inflammation.    Patient wishes to proceed with surgery in the second eye. Risks, benefits, alternatives reviewed. IOL selection reviewed.     Left eye  IOL: CNA0T0 22.0      Hx ERM with pucker OD>>OS followed by Dr Sarkar

## 2023-09-01 NOTE — TELEPHONE ENCOUNTER
"----- Message from Melissa Fournier sent at 8/31/2023  4:10 PM CDT -----    ----- Message -----  From: Apollo Willard  Sent: 8/31/2023   4:04 PM CDT  To: Vic [facility]KF[facility] Staff    Consult/Advisory:          Name Of Caller: Self      Contact Preference?: [phone]      Provider Name: Vic      What is the nature of the call?: Calling to speak w/ nurse about "pre-op discussion" for next procedure          Additional Notes:  "Thank you for all that you do for our patients"        "

## 2023-09-06 ENCOUNTER — TELEPHONE (OUTPATIENT)
Dept: OPHTHALMOLOGY | Facility: CLINIC | Age: 83
End: 2023-09-06
Payer: MEDICARE

## 2023-09-06 DIAGNOSIS — H25.12 NUCLEAR SCLEROTIC CATARACT OF LEFT EYE: Primary | ICD-10-CM

## 2023-09-08 ENCOUNTER — TELEPHONE (OUTPATIENT)
Dept: OPHTHALMOLOGY | Facility: CLINIC | Age: 83
End: 2023-09-08
Payer: MEDICARE

## 2023-09-08 NOTE — TELEPHONE ENCOUNTER
Patient given arrival time of 8:00 am on Monday September 11 . Nothing to eat or drink after 9 pm.  Water, Gatorade after 9 pm until leaves home.  Start drops into the operative eye today. [address] [facility]

## 2023-09-08 NOTE — TELEPHONE ENCOUNTER
----- Message from Love Russo sent at 9/8/2023  9:53 AM CDT -----  Regarding: Medications  Pt has questions about his medications before surgery 9/11.    Call back- [phone]

## 2023-09-11 ENCOUNTER — TELEPHONE (OUTPATIENT)
Dept: OPHTHALMOLOGY | Facility: CLINIC | Age: 83
End: 2023-09-11
Payer: MEDICARE

## 2023-09-11 NOTE — TELEPHONE ENCOUNTER
----- Message from Britney Montoya sent at 9/9/2023 10:01 AM CDT -----  Regarding: Cancel Procedure  Contact: pt  Pt would like to Cancel Procedure    Please call to advise    Phone- Between- [phone],..Hais- [phone]    Thank You

## 2024-05-07 NOTE — TELEPHONE ENCOUNTER
Virtual COVID Recovery Clinic Initial Intake Visit    This visit is being performed virtually via Video visit using [employer].   Clinical Location: [facility] [address]  Beltran's location Home and is physically present in   the [facility] at the time of this visit.      Patient provided verbal consent for friends or family to be in room while medical information is discussed.       Beltran Crow is patient of Gabby Sharpe MD referred to the Virtual Covid Recovery Clinic for evaluation, management and care.    Pt history and course of COVID 19 infection was reviewed and documented by my team and myself.   Pt validated and heard.     Munira Armstrong, RN  2024  3:19 PM  Sign when Signing Visit    Intake Form for Virtual Covid Recovery Clinic      Referring MD:  Gabby Sharpe MD      PCP:  Gabby Sharpe MD     Speciality Providers:  Jerry Ding-Urology; Jerry Mcgowan MD- Pain Management       Pt meets criteria    [] Symptoms have been persistent for greater than 4 weeks ( 30 days) since initial diagnosis of COVID 19 and are not explainable by alternative diagnosis  [x] Symptoms were not present prior to diagnosis of COVID 19 nor explainable by a diagnosis present prior to COVID 19 diagnosis    COVID 19 diagnosis date of initial diagnosis:  He was never diagnosed with COVID.  He has had some \"bizarre\" symptoms for several years starting around the summer of .  About 1 week ago he researched and wondered if he may have had asymptomatic COVID and is now suffering from Long COVID.  His wife passed away 2.5 years ago.  A few months after he started noticed a \"white noise\" in his head which he described sounding like a hum. He thought this might be related to his being alone as his 16 year old dog  about 6 months prior to his wife.  Beltran reports he gets hit with incredible fatigue as well as light headedness and feeling unsteady when getting up at  Left message for patient call back    times. He is seeing cardiology and sleep medicine also.     Inpatient treatment No    Outpatient treatment:  [] Paxlovid  []Hydroxychloroquine  []   [] Other therapies:   [] Steroids  []Albuterol inhaler  []Inhaler with corticosteroid  [] Antibiotics    Additional documentation:              In course of past workup and care pt has had:    Lab:  [x] CMP [x] CBC [x] TSH [] T4 [x] FE [x] B12 [] FOLATE [x] FERRITIN [x]MAGNESIUM  [x]VIT D  ZINC    MEDICATIONS:  []FLUOXETINE [x]VITAMIN D3   []PROVIGIL []MELATONIN   []GABAPENTIN []MAGNESIUM   []PPI [x]ZINC   []FLUTICASONE []MULTIVITAMIN   []PREDNISONE []IRON   [x]ASA []VITAMIN C CHEWABLE   [x]FAMOTIDINE [x]VITAMIN C   [x]BEET ROOT POWDER [x]CO Q10   [] [x]TURMERIC CURCUMIN     DIAGNOSTICS:  [x]ECHO (4/7/23)  []CT ANGIO  [x]LE DOPPLER (7/6/21- [facility])  []TILT TABLE  [x]STRESS TEST (3/22/24- Per patient he did the medicinal stress test but they were unable to get his heart rate up during the test)  [x]SLEEP STUDY(11/2/23- Per patient using a mouth apparatus to help with mild sleep apnea.  This is helping with him sleep better and feel more refreshed than in the past; retest on 4/17/24- at home sleep study)  []6 MIN WALK TEST  [x]NUC MED MYOCARDIAL PERFUSION SPECT MULTI (3/22/24)  [x]PFT (4/8/24)        CONSULTS:  []PULMONOLOGY  [x]CARDIOLOGY  []ELECTROPHYSIOLOGY  []NEUROLOGY  []BEHAVIORAL HEALTH  []OT  []PT  []SPEECH  [x]NEUROLOGICAL SURGERY  [x]ENT  [x]AUDIOLOGY  [x]SLEEP MEDICINE     Vaccine status now?   5 doses of Pfizer     Was patient vaccinated prior to COVID? fully vaccinated - prior to onset of symptoms               Is patient able to return to work ?  He is not currently working, but is hoping to get back to work soon. He is hoping to be able to work full time.     Overall, what is your most prominent symptom from COVID?   Fatigue- He has had fatigue his whole life.  He may have to take a 10-15 minute power nap but then will be refreshed.    Brain fog  White noise  described like a hum in his head which patient noticed in Summer 2021     He is able to walk about 2.5-3 miles daily.  He stays active and social      Barriers      None noted    What is the best way to contact you in the future? Credible    Cell Phone:   Telephone Information:   Mobile [phone]     Okay to leave a message containing results? Yes      Comments:        Screening questionnaire scores for this visit:     COVID Recovery Clinic Questionnaire Scores    Chalder Fatigue Scale-11:  7/33  higher number higher impact    IES-6 : 3/24  higher number higher impact    PHQ-9 : Last four PHQ 2/9 Test Results  0: Not at all  1: Several days  2: More than half the days  3: Nearly every day          4/26/2024     9:42 AM 4/12/2024    11:17 AM 3/25/2024     2:00 PM 3/10/2024     3:40 PM   PHQ 2 Score   Adult PHQ 2 Score 0 0 0 0   Adult PHQ 2 Interpretation No further screening needed No further screening needed No further screening needed No further screening needed   Little interest or pleasure in activity? 0 0 0 0         3/25/2024     2:00 PM 10/12/2022     5:45 PM 5/5/2022    10:24 AM 5/3/2021     9:49 AM   PHQ 9 Score   Adult PHQ 9 Score 4 0 0 0   Adult PHQ 9 Interpretation Minimal Depression              GIANNA-7:  Last four GAD7 Assessments           3/25/2024    11:58 AM 1/25/2023    10:25 AM 3/29/2022    10:23 AM 11/4/2021    10:18 AM   GAD7 Screening   GAD7 Score 0      Feeling nervous, anxious or on edge Not at all      Not being able to stop or control worrying Not at all      Worrying too much about different things Not at all      Trouble relaxing Not at all      Being so restless that it's hard to sit still Not at all      Becoming easily annoyed or irritable Not at all      Feeling afraid as if something awful might happen Not at all      Ability to handle work, home and other people Not difficult at all      Date/time completed by patient via Credible  1/25/2023 10:22 AM 3/29/2022 10:20 AM    3/29/2022  10:20 AM    3/29/2022 10:20 AM    3/29/2022 10:21 AM 11/4/2021 10:15 AM    11/4/2021 10:15 AM    11/4/2021 10:15 AM        MMRC:     score > 3 higher impact on prognosis    Shortness of breath described in the last two weeks as:  0- I only get breathless with strenuous activity    EQ-5D-5L:   Mobility: I have no problems in walking about  Self-Care: I have no problems washing or dressing myself.  Usual Activities: I have no problems doing my usual activities.  Pain/Discomfort: I have no pain or discomfort.  Anxiety/Depression: I am not anxious or depressed.    1. Since your recovery from COVID, in the last 2 weeks, have you experienced…      Fatigue  [x]  YES    []  NO  Muscle OR body aches      [x]  YES    []  NO    Due to lower back pain  Difficulty concentrating OR Memory loss  []  YES    [x]  NO  He may forget a word for a few minutes but it comes back to him  Difficulty sleeping   [x]  YES    []  NO  \"I wake up in the middle of the night and have to get up for about an hour\".  He will do dishes or a light activity and then go back to bed  Headache  []  YES    [x]  NO  Shortness of breath  [x]  YES    []  NO  Chest pain  []  YES    [x]  NO  Palpitations  []  YES    [x]  NO  Loss of taste  []  YES    [x]  NO  Loss of smell  []  YES    [x]  NO  Nausea  []  YES    [x]  NO  Diarrhea  []  YES    [x]  NO  Exercise intolerance  []  YES    [x]  NO  Fever /chills  [x]  YES    []  NO  Chills, no fever  Anxiety   []  YES    [x]  NO  Depression  []  YES    [x]  NO     2. If you answered yes to fatigue… Yes No     Does fatigue impair your ability to engage in pre-illness levels of occupational,   educational, social, or personal activities?  []  YES    [x]  NO  Some people have asked him to participate on 6-8 mile walks.  He has declined these.  He is able to walk about 2-3 miles without issue  Do you feel unwell after exerting yourself physically or cognitively?  []  YES    [x]  NO  Is your sleep refreshing?  [x]  YES    []   NO  Do you feel your cognitive abilities are impaired?  []  YES    [x]  NO  Do your symptoms of fatigue worsen upon assuming and maintaining upright   posture AND improved, although not necessarily abolished, by lying back down or   elevating the feet?  [x]  YES    []  NO  Short naps laying down for about 10-15 minutes rejuvenates him     Notices fatigue mostly with standing for longer periods of time               Pt states today that their primary reason to seek further care is:      HISTORY OF PRESENT ILLNESS  Patient states that he has several primary symptoms  He states that he has white noise in his head. He states he believes this is mental fog?  It is a noise in his head.  Sometimes louder than other times.  ??tinnitus??  Pt is unsure.  Pt saw audiology and ENT.  Physician did an ancillary exam and discussed \"hearing loss\".  Discussed hearing issues.  Then saw audiology and had same result that he had with multiple other Audiology exams.    Sometimes he suffers from extreme fatigue.   He does not know that he ever had COVID.    At times he is lightheaded when he stands and has a weakness or shakiness.   States he is very active and walks 2.5 miles daily and has an active social life.       1. Other fatigue    2. Elevated vitamin B12 level    3. Low zinc level    4. Tinnitus of both ears    5. Vitamin D deficiency, unspecified        REVIEW OF SYSTEMS  Review of Systems   Constitutional:  Positive for malaise/fatigue.   HENT:  Positive for tinnitus. Negative for congestion and sinus pain.    Respiratory:  Negative for cough and shortness of breath.    Gastrointestinal:  Negative for nausea.   Musculoskeletal:  Positive for myalgias. Negative for joint pain.   Neurological:  Positive for weakness. Negative for headaches.   Psychiatric/Behavioral:  Negative for memory loss. The patient is not nervous/anxious and does not have insomnia.          PHYSICAL EXAM       Physical Exam  Constitutional:        Appearance: Normal appearance.   Pulmonary:      Effort: Pulmonary effort is normal.   Neurological:      General: No focal deficit present.      Mental Status: He is alert and oriented to person, place, and time.   Psychiatric:         Mood and Affect: Mood normal.         Behavior: Behavior normal.         Thought Content: Thought content normal.         Judgment: Judgment normal.           ASSESSMENT:  1. Other fatigue    2. Elevated vitamin B12 level    3. Low zinc level    4. Tinnitus of both ears    5. Vitamin D deficiency, unspecified      PLAN:  Discussed with pt that I am unsure if he indeed has Long COVID.  Discussion of recommendations as below for treatment and will see back if not improving.  May be that he has a combination of medical issues.      Return if symptoms worsen or fail to improve.  I spent a total of 78 minutes on the day of the visit.       Evaluation and management of patient included pre-charting, chart review, documenting, office visit and referring/communicating with other health care professionals.      Patient Instructions   Today we discussed your symptoms and the potential of a COVID Long Haul Diagnosis and how it might be affecting you and your loss of ability to do your normal activities. In that discussion, we reviewed your course of workup and care since your original diagnosis.  You have had a thorough diagnostic workup.  Any diagnosis which have already been uncovered and treatment options recommended should be continued as we discussed.    We had a thorough discussion today to try and find new ideas and options to help you to feel better and have an improved quality of life.  This may include recommendations of lifestyle modifications for you to implement or referrals or workup that still needs to be done.  Long COVID is a work in progress.  Know that we are here to work with you as a team.     We discussed working on the following:  We discussed that your symptoms are  similar to many patients with Long Haul COVID.  Long Haul COVID is thought to be an inflammatory response of the body to viral illness and potential that inflammatory response either exacerbating an existing or causing the exacerbation of an autoimmune illness.  The symptom grouping thus is inflammatory and autoimmune in its activity.   We have some experience with this type of illness in the past with ME/CFS and thus can trial some of the treatments that have helped patients in the past while research teams work on theories and treatment options that may help prevent and treat Long COVID for the future.  We discussed that the white noise you are experiencing may be tinnitus.  I recommend thoroughly discussing this with the ENT and audiologist prior to pursuing a purchase of hearing aides as the choice or type of aide may be best determined by knowing if tinnitus is present.  Expectations of outcomes are a good thing with hearing aides.    We did discuss the if you indeed had a COVID viral infection, tinnitus could be one of many resultant conditions.  Tinnitus can also be present as a result of certain types of hearing loss.   We discussed fatigue as a function of post viral illness and also of vitamin deficiency and of age.  It is difficult to determine an exact cause and effect of your fatigue, but it would be beneficial to institute the lifestyle modifications set forth below to see their impact on your symptoms.    We also discussed that Long Haul COVID can be more than just post actual viral illness.  You may be suffering from the physical effects of post pandemic trauma.  The lifestyle changes should also assist in treating these as well.         It is important to remember that Long Covid is an evolving illness and will take work and support.  Please enlist the support and assistance of your family and friends to encourage you and make small changes to help you rebuild a new normal.  Please send me any  questions or concerns via [employer] and myself or my team will address them or arrange for an appointment to address.  My RN Care Coordinator has a direct line should you need it is [phone].    Thank you,  Dr Griffith        For all persons with Long Haul COVID there are some basic lifestyle changes that can improve your wellness.  These may seem simple and you may believe that you have already tried these and they did not help.   We ask that you implement these tools again and keep them up daily.    Correcting any underlying conditions that are found on workup  Vitamin D3 5000 units daily  Co Q 10   Hydration with a minimum of 1/2 your body weight in ounces in non to low caloric fluids daily.  Example:  if you weigh 150 pounds then you should consume at the least 75 oz of fluids daily.  For many, using an electrolyte solution at least once daily in your hydration strategy is also very helpful.  Please ask if you need help with this    30 minutes of gentle activity daily which can be broken up into smaller segments if needed    Melatonin nightly.  Melatonin should be at a dose of 5-10 mg daily      Long Haul Assessment/Plan: Fatigue in Long Haul COVID is common just as it can be after other infections such as Lymes or autoimmune illnesses.  This fatigue also co exists with other of the symptoms of Long Haul COVID. Along with looking for underlying causes, treatment might consist of the following now or at a future date:      [] Physical therapy ( conditioning)  [] Occupational therapy (Energy conservation techniques, workstation modification, brain fog strategies)    WHAT ARE ELECTROLYTES?  Electrolytes are minerals in the body that produce an electric current when dissolved in water and are responsible for maintaining fluid balance, balancing the body’s pH level. They also move nutrients in cells, push waste out of cells, and keep the normal function of all of our nerves and muscles - especially our heart and  brain.  The level of electrolytes in our body is highly influenced by the amount of food and water we consume or lack thereof.     SHOULD YOU USE ELECTROLYTE DRINKS?  Yes! Consumption of electrolyte beverages can also be helpful to prevent electrolyte imbalance and dehydration and is highly recommended when intense exercise exceeds 1 hour in duration or is taking place in hot, humid environments where sweat rates are significantly increased. Water follows sodium, which is why electrolytes are an excellent hydrating agent.    THE BEST DRINK TO REPLACE ELECTROLYTES     Choosing electrolyte beverages such as Pedialyte, sports drinks like Gatorade, Powerade, or Body armor, electrolyte infused pineda, or mixing electrolyte powders in your water is highly recommended if participating in activities outside for long durations. There are many ways to make your own electrolyte solutions as well.  Coconut water, salted lemon juice water, tomato juice are some of the base fluids used for these solutions.     However, consuming too many electrolytes is possible and can also lead to issues such as nausea, vomiting, diarrhea, and mental confusion. So it’s important to not drink excessive amounts of electrolyte beverages in a short period. Slow, steady consumption before, during and after intentional physical activity is recommended.  And if you have a medical condition where electrolyte replenishment has been recommended then slow and steady and and ask your health care professional how much and how often you should be using electrolyte fluid replacement vs water.  You cannot overdo foods that naturally have electrolytes in them!!    COMMON SIGNS OF ELECTROLYTE IMBALANCE  Common signs of electrolyte imbalance include:  - dizziness  - muscle cramps  - irregular heartbeat  - mental confusion  - headache  - fatigue  - dark-colored urine  - nausea & vomiting    Since most people will experience signs and symptoms of low electrolyte  levels due to dehydration, it’s important to ensure you’re consuming adequate water and electrolytes throughout the day. Luckily, many electrolytes exist naturally in foods        Homemade Electrolyte Drink Solutions  Coconut water with pinch salt ( can dilute with water to taste)  4 cup water, 1/2 cup Lemon juice, 1/4 tsp salt, optional use of flavoring drops for water  6 juan francisco juiced, 1 TBSP maple syrup or honey, 1 tsp salt, 1/2 tsp baking soda  Tomato juice, 1/2 tsp salt, 1/2 tsp lemon juice, 1/2 tsp sugar, herbs to taste     TO REPLENISH SODIUM   Salted Nuts  Pretzels  Crackers  Deli Meats  Smoked Udall  Canned Beans  Broth  TO REPLENISH POTASSIUM  Bananas  Dried fruits  Potatoes  Leafy Greens  Citrus  Coconut  Avocado  Zucchini  Mushrooms  Halibut, Cod, or Tuna  Legumes  TO REPLENISH CALCIUM  Green leafy vegetables  Milk  Yogurt  Almonds  Broccoli  Fortified Cereals  TO REPLENISH MAGNESIUM  Pumpkin Seeds  Spinach  Dark Chocolate  Nuts  Whole Grains  Peanut Butter      Note to patient: The 21st Century Cures Act makes medical notes like this one available to patients in the interest of transparency. Please be advised that this is a medical document. It is intended as [id number] communication and fact documentation. It is written in medical language and may contain abbreviations or verbiage that is unfamiliar. It may appear blunt or direct. Medical documents are intended to carry relevant information, facts as evident, and the clinical opinion of the practitioner.

## 2025-03-10 ENCOUNTER — OFFICE VISIT (OUTPATIENT)
Dept: URGENT CARE | Facility: CLINIC | Age: 85
End: 2025-03-10
Payer: MEDICARE

## 2025-03-10 VITALS
OXYGEN SATURATION: 99 % | HEIGHT: 73 IN | HEART RATE: 82 BPM | TEMPERATURE: 98 F | BODY MASS INDEX: 23.19 KG/M2 | SYSTOLIC BLOOD PRESSURE: 130 MMHG | DIASTOLIC BLOOD PRESSURE: 71 MMHG | RESPIRATION RATE: 16 BRPM | WEIGHT: 175 LBS

## 2025-03-10 DIAGNOSIS — U07.1 COVID-19 VIRUS DETECTED: ICD-10-CM

## 2025-03-10 DIAGNOSIS — U07.1 COVID-19 VIRUS INFECTION: Primary | ICD-10-CM

## 2025-03-10 LAB
CTP QC/QA: YES
SARS CORONAVIRUS 2 ANTIGEN: POSITIVE

## 2025-03-10 PROCEDURE — 87811 SARS-COV-2 COVID19 W/OPTIC: CPT | Mod: QW,S$GLB,, | Performed by: FAMILY MEDICINE

## 2025-03-10 PROCEDURE — 99213 OFFICE O/P EST LOW 20 MIN: CPT | Mod: S$GLB,,, | Performed by: FAMILY MEDICINE

## 2025-03-10 RX ORDER — BENZONATATE 100 MG/1
100 CAPSULE ORAL 3 TIMES DAILY PRN
Qty: 30 CAPSULE | Refills: 1 | Status: SHIPPED | OUTPATIENT
Start: 2025-03-10 | End: 2025-03-20

## 2025-03-11 ENCOUNTER — TELEPHONE (OUTPATIENT)
Dept: URGENT CARE | Facility: CLINIC | Age: 85
End: 2025-03-11

## 2025-03-11 NOTE — PROGRESS NOTES
"Subjective:      Patient ID: Joni Avelar is a 84 y.o. male.    Vitals:  height is 6' 1" (1.854 m) and weight is 79.4 kg (175 lb). His oral temperature is 97.7 °F (36.5 °C). His blood pressure is 130/71 and his pulse is 82. His respiration is 16 and oxygen saturation is 99%.     Chief Complaint: Sinus Problem    This is a 84 y.o. male who presents today with a chief complaint of   Congestion, scratchy throat, and body aches. Symptoms started yesterday. Son in law had flu last week    Sinus Problem  This is a new problem. The current episode started yesterday. The problem has been gradually worsening since onset. There has been no fever. The pain is mild. Associated symptoms include chills, congestion, coughing, headaches, a hoarse voice and sinus pressure. Pertinent negatives include no sore throat. Treatments tried: matthew seltzer plus.       Constitution: Positive for chills.   HENT:  Positive for congestion and sinus pressure. Negative for sore throat.    Respiratory:  Positive for cough.    Neurological:  Positive for headaches.      Objective:     Physical Exam   Constitutional: He does not appear ill. No distress. normal  HENT:   Head: Normocephalic and atraumatic.   Nose: Congestion present.   Mouth/Throat: Mucous membranes are moist. Posterior oropharyngeal erythema present.   Eyes: Pupils are equal, round, and reactive to light. Extraocular movement intact   Neck: Neck supple.   Cardiovascular: Normal rate, regular rhythm, normal heart sounds and normal pulses.   Pulmonary/Chest: Effort normal and breath sounds normal.   Abdominal: Normal appearance and bowel sounds are normal. Soft.   Neurological: He is alert.   Nursing note and vitals reviewed.    Results for orders placed or performed in visit on 03/10/25   SARS Coronavirus 2 Antigen, POCT Manual Read    Collection Time: 03/10/25  7:32 PM   Result Value Ref Range    SARS Coronavirus 2 Antigen Positive (A) Negative, Presumptive Negative    Quality " Control Acceptable Yes       Assessment:     1. COVID-19 virus infection        Plan:       COVID-19 virus infection  -     SARS Coronavirus 2 Antigen, POCT Manual Read  -     molnupiravir 200 mg capsule (EUA); Take 4 capsules (800 mg total) by mouth every 12 (twelve) hours. for 5 days  Dispense: 40 capsule; Refill: 0  -     benzonatate (TESSALON) 100 MG capsule; Take 1 capsule (100 mg total) by mouth 3 (three) times daily as needed for Cough.  Dispense: 30 capsule; Refill: 1    Discussed symptom monitoring, contact notification and isolation policy. ER precautions.

## 2025-03-11 NOTE — TELEPHONE ENCOUNTER
Pt was called at 12:35 pm to inform him that the [facility] no longer carried the prescription  Molnupiravir and that we sent over a new prescription of Molnupiravir to [facility] by Dr. Martin. . We received a phone call from [facility] no longer carried the prescription Molnupiravir at 12:20 pm. We tried to call [facility] at 12:33 pm with no answer. We sent over a new prescription of Molnupiravir to [facility] by Dr. Martin at 12:34 pm.

## 2025-03-11 NOTE — PATIENT INSTRUCTIONS
the CDC now says people who have Covid-19 should stay home until theyve been fever-free without medication for at least 24 hours and their symptoms have been improving for 24 hours.  After that, its fine to resume regular activities, agency experts say. But they recommend that people take additional precautions for the next five days -- including improving ventilation, masking and limiting close contact with others -- to lower the risk of spreading the virus.  These enhanced precautions are particularly important for people who are around vulnerable individuals, such as those who are elderly or have immune function thats been blunted by medication or an illness, like cancer.

## 2025-03-14 ENCOUNTER — TELEPHONE (OUTPATIENT)
Dept: DERMATOLOGY | Facility: CLINIC | Age: 85
End: 2025-03-14
Payer: MEDICARE

## 2025-03-14 NOTE — TELEPHONE ENCOUNTER
Spoke with with office we did not receive referral they will be sending it through our email.      ----- Message from Ander Sheets sent at 3/14/2025 11:15 AM CDT -----  Regarding: referral status  PATIENT CALLNorielle from Dr Jessica Coller [facility] office called regarding [facility] referral, states that it was sent early March and pt has yet to be scheduled. Verified that they have the correct fax no, please call directly at [phone] to assist further.NOTE: Dr [facility]'s team can be reached at [phone]

## 2025-03-18 ENCOUNTER — TELEPHONE (OUTPATIENT)
Dept: DERMATOLOGY | Facility: CLINIC | Age: 85
End: 2025-03-18
Payer: MEDICARE

## 2025-03-18 NOTE — TELEPHONE ENCOUNTER
Pt is referral from Dr. [facility] for [facility] L nasal ala. Left message to give us a call back to get scheduled.

## 2025-03-20 ENCOUNTER — TELEPHONE (OUTPATIENT)
Dept: DERMATOLOGY | Facility: CLINIC | Age: 85
End: 2025-03-20
Payer: MEDICARE

## 2025-03-20 NOTE — TELEPHONE ENCOUNTER
----- Message from Ander Sheets sent at 3/20/2025 11:48 AM CDT -----  Regarding: return call  PATIENT RETURNING CALLPt returned Radha's call regarding MOHS scheduling. Referral on file from Dr [facility], [facility] L nasal ala. Please call pt at [phone]

## 2025-03-24 ENCOUNTER — TELEPHONE (OUTPATIENT)
Dept: DERMATOLOGY | Facility: CLINIC | Age: 85
End: 2025-03-24
Payer: MEDICARE

## 2025-03-24 NOTE — TELEPHONE ENCOUNTER
Pt is referral from Dr. [facility] for BCC L nasal ala. Over the phone consult completed. Explained that we would need to coordinate with Dr. Colvin because the lesion is on the nasal rim. Let him know I would give him a call once we have dates picked. Pt confirmed understanding.

## 2025-03-24 NOTE — TELEPHONE ENCOUNTER
----- Message from El sent at 3/24/2025  1:18 PM CDT -----  Regarding: Returning a Missed Call  Contact: [phone]  Returning a Missed Call Caller: Joni Avelar  Returning call to:  Radha Cardenas Caller can be reached @: [phone] Nature of the call: Pt is returning a call to schedule his Mohs.

## 2025-03-25 ENCOUNTER — TELEPHONE (OUTPATIENT)
Dept: DERMATOLOGY | Facility: CLINIC | Age: 85
End: 2025-03-25
Payer: MEDICARE

## 2025-03-25 ENCOUNTER — TELEPHONE (OUTPATIENT)
Dept: OTOLARYNGOLOGY | Facility: CLINIC | Age: 85
End: 2025-03-25
Payer: MEDICARE

## 2025-03-25 DIAGNOSIS — C44.300 SKIN CANCER OF FACE: Primary | ICD-10-CM

## 2025-03-25 NOTE — TELEPHONE ENCOUNTER
----- Message from Pablo Colvin MD sent at 3/24/2025  5:49 PM CDT -----  Regarding: RE: Scheduling Procedures  Hi Radha - thanks for the heads up. 5/29 will work well on my end. Cc'ing Ruby Maldonado my  to facilitate. -Pablo  ----- Message -----  From: Radha Cardenas MA  Sent: 3/24/2025   2:37 PM CDT  To: Pablo Colvin MD  Subject: Scheduling Procedures                            Hi Dr. Colvin, This pt has a BCC on his L nasal ala and Dr. Russell would like to coordinate with you following his Mohs procedure. Our next available would be May 12th if you're available on the 13th. We also have May 21st if you're available on the 22nd or the 28th if you're available on the 29th. Please let me know which dates works best. Thanks!Radha

## 2025-03-25 NOTE — TELEPHONE ENCOUNTER
----- Message from Ander Sudeep sent at 3/25/2025 11:57 AM CDT -----  Regarding: pt advice  PATIENT CALLPt called regarding upcoming MOHS procedure. States that his wife also was seen by this physician and he doesn't understand why plastics/ENT is being brought in on his case. Explained that plan of care can be different depending on severity of case. Attempted to transfer to [facility] on ENT team. Pt asking specifically to speak w/ Dr Russell's team to clarify.Please call back at [phone]

## 2025-04-02 ENCOUNTER — OFFICE VISIT (OUTPATIENT)
Dept: DERMATOLOGY | Facility: CLINIC | Age: 85
End: 2025-04-02
Payer: MEDICARE

## 2025-04-02 DIAGNOSIS — C44.311 BASAL CELL CARCINOMA OF LEFT ALA NASI: Primary | ICD-10-CM

## 2025-04-02 PROCEDURE — 1101F PT FALLS ASSESS-DOCD LE1/YR: CPT | Mod: CPTII,S$GLB,, | Performed by: DERMATOLOGY

## 2025-04-02 PROCEDURE — 99203 OFFICE O/P NEW LOW 30 MIN: CPT | Mod: S$GLB,,, | Performed by: DERMATOLOGY

## 2025-04-02 PROCEDURE — 1160F RVW MEDS BY RX/DR IN RCRD: CPT | Mod: CPTII,S$GLB,, | Performed by: DERMATOLOGY

## 2025-04-02 PROCEDURE — 99999 PR PBB SHADOW E&M-EST. PATIENT-LVL III: CPT | Mod: PBBFAC,,, | Performed by: DERMATOLOGY

## 2025-04-02 PROCEDURE — 1159F MED LIST DOCD IN RCRD: CPT | Mod: CPTII,S$GLB,, | Performed by: DERMATOLOGY

## 2025-04-02 PROCEDURE — 3288F FALL RISK ASSESSMENT DOCD: CPT | Mod: CPTII,S$GLB,, | Performed by: DERMATOLOGY

## 2025-04-02 NOTE — PROGRESS NOTES
REFERRING PROVIDER:  Jessica [facility], M.D.    CHIEF COMPLAINT:  New patient being consulted for Mohs' surgery evaluation.    HISTORY OF PRESENT ILLNESS:  84 y.o. male presents with a 2 month(s) history of growth on the L nasal ala. Denies pain. (+) bleeding, scabbing.    Positive for scabbing.  Negative for crusting.  Positive for bleeding.  Negative for itching.    Biopsy consistent with basal cell carcinoma.     No prior treatment.    Pacemaker: No  Defibrillator: No  Artificial joints: No  Artificial heart valves: No    PAST MEDICAL HISTORY:  Past Medical History:   Diagnosis Date    BCC (basal cell carcinoma) 04/02/2025    L nasal ala    Hypertension     MVP (mitral valve prolapse)        PAST SURGICAL HISTORY:    Past Surgical History:   Procedure Laterality Date    CATARACT EXTRACTION W/  INTRAOCULAR LENS IMPLANT Right 8/21/2023    Procedure: EXTRACTION, CATARACT, WITH IOL INSERTION;  Surgeon: Jewel Ho MD;  Location: [facility];  Service: Ophthalmology;  Laterality: Right;    ORIF right 5th finger  2011    And ligament repostioning        SOCIAL HISTORY:  Dependencies:  never smoked    PERTINENT MEDICATIONS:  See medications list.  aspirin and Eliquis (apixaban)    ALLERGIES:  Cortisone    ROS:  Skin: See HPI  Constitutional: No fatigue, fever, malaise, weight loss, or night sweats.  Cardiovascular: No chest pain, palpitations, or edema.  Respiratory: No coughing, wheezing, SOB, or sputum production.    Physical Exam   HENT:   Nose:         General: Mood and affect normal. Alert and orient X3. Normal appearance.  Eyelids:  no suspicious lesions  Head/Face: L inferomedial nasal ala at rim with a 4 x 4 mm pink bx site located 5 cm inferiorly from the left medial canthus, 2.5 cm medially from the left alar base, and 0.2 cm superiorly from the alar rim.   Lips/Teeth/Gums:  no suspicious lesions     IMPRESSION:  Biopsy proven nodular basal cell carcinoma- L nasal ala, path# [id number].    PLAN:  The  diagnosis and the pathology report were discussed in detail with the patient. Treatment options were reviewed, including Mohs Micrographic Surgery, radiation, topical therapy, and standard excision.  After careful review of patient's history and physical exam, and after discussion of treatment options, the decision was made to perform Mohs micrographic surgery.     Scheduled patient for Mohs Micrographic Surgery. Risks, benefits, and alternatives of Mohs' surgery discussed with the patient. Discussed repair options including complex closure, skin flap, skin graft and second intention healing with the patient. Patient prefers not to go to Plastics for outside reconstruction but understands that he may have to be referred to Plastics if tumor is larger than anticipated. Pre-operative instructions provided to the patient.  Pt expressed concerns about bleeding and states he has been given clearance recently to get off Eliquis prior to hernia operation few months ago.  Okay to stop Eliquis x 2 days before in preparation for possible need for plastics reconstruction, but stay on aspirin and do not stop aspirin.

## 2025-04-10 ENCOUNTER — OFFICE VISIT (OUTPATIENT)
Dept: URGENT CARE | Facility: CLINIC | Age: 85
End: 2025-04-10
Payer: MEDICARE

## 2025-04-10 VITALS
TEMPERATURE: 98 F | WEIGHT: 175 LBS | SYSTOLIC BLOOD PRESSURE: 125 MMHG | BODY MASS INDEX: 23.19 KG/M2 | DIASTOLIC BLOOD PRESSURE: 68 MMHG | RESPIRATION RATE: 19 BRPM | OXYGEN SATURATION: 98 % | HEIGHT: 73 IN | HEART RATE: 74 BPM

## 2025-04-10 DIAGNOSIS — I48.91 ATRIAL FIBRILLATION, UNSPECIFIED TYPE: Primary | ICD-10-CM

## 2025-04-10 LAB
OHS QRS DURATION: 92 MS
OHS QTC CALCULATION: 425 MS

## 2025-04-10 PROCEDURE — 93010 ELECTROCARDIOGRAM REPORT: CPT | Mod: S$GLB,,, | Performed by: INTERNAL MEDICINE

## 2025-04-10 PROCEDURE — 93005 ELECTROCARDIOGRAM TRACING: CPT | Mod: S$GLB,,, | Performed by: NURSE PRACTITIONER

## 2025-04-10 PROCEDURE — 99214 OFFICE O/P EST MOD 30 MIN: CPT | Mod: S$GLB,,, | Performed by: NURSE PRACTITIONER

## 2025-04-10 RX ORDER — PANTOPRAZOLE SODIUM 40 MG/1
40 TABLET, DELAYED RELEASE ORAL EVERY MORNING
COMMUNITY
Start: 2025-01-26

## 2025-04-10 RX ORDER — ONDANSETRON 4 MG/1
4 TABLET, FILM COATED ORAL EVERY 6 HOURS PRN
COMMUNITY
Start: 2024-10-04

## 2025-04-10 RX ORDER — NIRMATRELVIR AND RITONAVIR 150-100 MG
KIT ORAL
COMMUNITY
Start: 2025-03-12

## 2025-04-10 RX ORDER — SODIUM PICOSULFATE, MAGNESIUM OXIDE, AND ANHYDROUS CITRIC ACID 12; 3.5; 1 G/175ML; G/175ML; MG/175ML
LIQUID ORAL
COMMUNITY
Start: 2024-11-25

## 2025-04-10 NOTE — PROGRESS NOTES
"Subjective:      Patient ID: Joni Avelar is a 84 y.o. male.    Vitals:  height is 6' 1" (1.854 m) and weight is 79.4 kg (175 lb). His respiration is 19.     Chief Complaint: Shortness of Breath    HPI  ROS   Objective:     Physical Exam    Assessment:     No diagnosis found.    Plan:       There are no diagnoses linked to this encounter.                "

## 2025-04-10 NOTE — PROGRESS NOTES
"Subjective:      Patient ID: Joni Avelar is a 84 y.o. male.    Vitals:  height is 6' 1" (1.854 m) and weight is 79.4 kg (175 lb). His oral temperature is 97.8 °F (36.6 °C). His blood pressure is 125/68 and his pulse is 74. His respiration is 19 and oxygen saturation is 98%.     Chief Complaint: Shortness of Breath    This is a 84 y.o. male with h/o A. Fib and MI, who presents today with a chief complaint of shortness of breath. Pt states he doing lawn work two days ago, when he became sob. Also had fatigue that day. Has been having mild, intermittent headaches. Denies chest pain. Denies blurry vision. He state he can feel palpitations at times, and would like to have an EKG done. He has upcoming echo that is already scheduled, then he will follow up with his PCP.  He states these symptoms are unusual for him.   States he eats well but could drink better.   Feels a little better today.     Shortness of Breath  This is a new problem. The current episode started in the past 7 days. The problem occurs constantly. The problem has been unchanged. Pertinent negatives include no abdominal pain, chest pain, claudication, coryza, ear pain, fever, headaches, hemoptysis, leg pain, leg swelling, neck pain, orthopnea, PND, rash, rhinorrhea, sore throat, sputum production, swollen glands, syncope, vomiting or wheezing.       Constitution: Positive for fatigue. Negative for fever.   HENT:  Negative for ear pain and sore throat.    Neck: Negative for neck pain.   Cardiovascular:  Positive for palpitations. Negative for chest pain, leg swelling and passing out.   Respiratory:  Positive for shortness of breath. Negative for sputum production, bloody sputum and wheezing.    Gastrointestinal:  Negative for abdominal pain, nausea and vomiting.   Skin:  Negative for rash.   Neurological:  Negative for headaches.      Objective:     Physical Exam   Constitutional: He is oriented to person, place, and time.  Non-toxic appearance. He " does not appear ill. No distress.      Comments:Appears stable. Good activity level. Normal speech. NAD.      HENT:   Head: Normocephalic.   Nose: Nose normal.   Mouth/Throat: Mucous membranes are moist.   Eyes: Right eye exhibits no discharge. Left eye exhibits no discharge.   Neck: Neck supple. No neck rigidity present.   Cardiovascular: Normal rate. An irregular rhythm present.   Pulmonary/Chest: Effort normal and breath sounds normal. No stridor. No respiratory distress. He has no wheezes. He has no rhonchi. He has no rales. He exhibits no tenderness.   Abdominal: Normal appearance.   Musculoskeletal: Normal range of motion.         General: Normal range of motion.   Neurological: He is alert and oriented to person, place, and time.   Skin: Skin is warm, dry and not diaphoretic.   Psychiatric: His behavior is normal. Mood normal.   Nursing note and vitals reviewed.    EKG: atrial fibrillation without RVR    Assessment:     1. Atrial fibrillation, unspecified type        Plan:   PT stable to be discharged home. [place] [facility] Pt encouraged to call him asap to try to get an earlier appt and echo. He states he will.   Also instructed that if any symptoms persist or worsen, to go to the ER for further testing.     Atrial fibrillation, unspecified type  -     EKG 12-lead; Future

## 2025-05-06 ENCOUNTER — PROCEDURE VISIT (OUTPATIENT)
Dept: DERMATOLOGY | Facility: CLINIC | Age: 85
End: 2025-05-06
Payer: MEDICARE

## 2025-05-06 VITALS — SYSTOLIC BLOOD PRESSURE: 112 MMHG | HEART RATE: 73 BPM | DIASTOLIC BLOOD PRESSURE: 65 MMHG

## 2025-05-06 DIAGNOSIS — C44.311 BASAL CELL CARCINOMA OF LEFT ALA NASI: Primary | ICD-10-CM

## 2025-05-06 PROCEDURE — 99499 UNLISTED E&M SERVICE: CPT | Mod: S$GLB,,, | Performed by: DERMATOLOGY

## 2025-05-06 PROCEDURE — 17311 MOHS 1 STAGE H/N/HF/G: CPT | Mod: S$GLB,,, | Performed by: DERMATOLOGY

## 2025-05-06 NOTE — PROGRESS NOTES
PROCEDURE: Mohs' Micrographic Surgery    INDICATION: Location in mask areas of face including central face, nose, eyelids, eyebrows, lips, chin, preauricular, temple, and ear. Biopsy-proven skin cancer of cosmetically and functionally important areas, including head, neck, genital, hand, foot, or areas known for having difficulty in healing, such as the lower anterior legs. Tumor with ill-defined borders.    REFERRING PROVIDER: Jessica [facility], M.D.    CASE NUMBER:     ANESTHETIC: 1 cc 1% Lidocaine, plain and 1 cc 0.5% Lidocaine with Epi 1:200,000 mixed 1:1 with 0.5% Bupivacaine    SURGICAL PREP: Hibiclens    SURGEON: Mary Russell MD    ASSISTANTS: Anushka Block MA    PREOPERATIVE DIAGNOSIS: basal cell carcinoma- nodular    POSTOPERATIVE DIAGNOSIS: basal cell carcinoma    PATHOLOGIC DIAGNOSIS: basal cell carcinoma- nodular    HISTOLOGY OF SPECIMENS IN FIRST STAGE:   Tumor Type: No tumor seen.    STAGES OF MOHS' SURGERY PERFORMED: 1    TUMOR-FREE PLANE ACHIEVED: Yes    HEMOSTASIS: electrocoagulation     SPECIMENS: 2     LOCATION: left nasal ala (inferior). Location verified with Dr. [facility]'s clinical photograph. Also verified with photo and measurements from consultation visit. Patient also verified location with hand held mirror.    INITIAL LESION SIZE: 0.3 x 0.3 cm    FINAL DEFECT SIZE: 0.5 x 0.5 cm    WOUND REPAIR/DISPOSITION: When the tumor was completely removed, repair options were discussed with the patient, and it was decided to let the wound heal by second intention. The patient tolerated the procedure well and will consider delayed reconstruction or repair if necessary.    The area was cleaned and dressed appropriately, and the patient was given wound care instructions, as well as an appointment for follow-up evaluation in one month.    Vitals:    05/06/25 0731 05/06/25 0851   BP: 123/74 112/65   BP Location: Left arm Left arm   Patient Position: Sitting Sitting   Pulse: 73 (P) 62

## 2025-05-07 ENCOUNTER — TELEPHONE (OUTPATIENT)
Dept: DERMATOLOGY | Facility: CLINIC | Age: 85
End: 2025-05-07
Payer: MEDICARE

## 2025-06-04 ENCOUNTER — OFFICE VISIT (OUTPATIENT)
Dept: DERMATOLOGY | Facility: CLINIC | Age: 85
End: 2025-06-04
Payer: MEDICARE

## 2025-06-04 DIAGNOSIS — C44.311 BASAL CELL CARCINOMA OF LEFT ALA NASI: Primary | ICD-10-CM

## 2025-06-04 PROCEDURE — 1126F AMNT PAIN NOTED NONE PRSNT: CPT | Mod: CPTII,S$GLB,, | Performed by: DERMATOLOGY

## 2025-06-04 PROCEDURE — 3288F FALL RISK ASSESSMENT DOCD: CPT | Mod: CPTII,S$GLB,, | Performed by: DERMATOLOGY

## 2025-06-04 PROCEDURE — 99999 PR PBB SHADOW E&M-EST. PATIENT-LVL III: CPT | Mod: PBBFAC,,, | Performed by: DERMATOLOGY

## 2025-06-04 PROCEDURE — 1101F PT FALLS ASSESS-DOCD LE1/YR: CPT | Mod: CPTII,S$GLB,, | Performed by: DERMATOLOGY

## 2025-06-04 PROCEDURE — 1160F RVW MEDS BY RX/DR IN RCRD: CPT | Mod: CPTII,S$GLB,, | Performed by: DERMATOLOGY

## 2025-06-04 PROCEDURE — 99212 OFFICE O/P EST SF 10 MIN: CPT | Mod: S$GLB,,, | Performed by: DERMATOLOGY

## 2025-06-04 PROCEDURE — 1159F MED LIST DOCD IN RCRD: CPT | Mod: CPTII,S$GLB,, | Performed by: DERMATOLOGY

## (undated) DEVICE — SOL BETADINE 5%

## (undated) DEVICE — Device

## (undated) DEVICE — SOL POVIDONE SCRUB IODINE 4 OZ

## (undated) DEVICE — GLOVE BIOGEL SKINSENSE PI 7.5